# Patient Record
Sex: FEMALE
[De-identification: names, ages, dates, MRNs, and addresses within clinical notes are randomized per-mention and may not be internally consistent; named-entity substitution may affect disease eponyms.]

---

## 2024-07-16 PROBLEM — Z00.00 ENCOUNTER FOR PREVENTIVE HEALTH EXAMINATION: Status: ACTIVE | Noted: 2024-07-16

## 2024-08-01 ENCOUNTER — NON-APPOINTMENT (OUTPATIENT)
Age: 39
End: 2024-08-01

## 2024-08-01 ENCOUNTER — APPOINTMENT (OUTPATIENT)
Dept: NEUROLOGY | Facility: CLINIC | Age: 39
End: 2024-08-01

## 2024-08-01 VITALS
WEIGHT: 155 LBS | SYSTOLIC BLOOD PRESSURE: 126 MMHG | HEART RATE: 66 BPM | DIASTOLIC BLOOD PRESSURE: 90 MMHG | OXYGEN SATURATION: 94 % | TEMPERATURE: 95.6 F | HEIGHT: 62 IN | BODY MASS INDEX: 28.52 KG/M2

## 2024-08-01 DIAGNOSIS — G91.9 HYDROCEPHALUS, UNSPECIFIED: ICD-10-CM

## 2024-08-01 DIAGNOSIS — Z78.9 OTHER SPECIFIED HEALTH STATUS: ICD-10-CM

## 2024-08-01 PROCEDURE — 99205 OFFICE O/P NEW HI 60 MIN: CPT

## 2024-08-01 RX ORDER — OMEPRAZOLE 20 MG/1
20 CAPSULE, DELAYED RELEASE ORAL
Refills: 0 | Status: ACTIVE | COMMUNITY

## 2024-08-01 RX ORDER — CLONIDINE HYDROCHLORIDE 0.1 MG/1
0.1 TABLET ORAL
Refills: 0 | Status: ACTIVE | COMMUNITY

## 2024-08-01 RX ORDER — POLYVINYL ALCOHOL 1.4 %
1.4 DROPS OPHTHALMIC (EYE)
Refills: 0 | Status: ACTIVE | COMMUNITY

## 2024-08-01 RX ORDER — NYSTATIN 100000 [USP'U]/ML
100000 SUSPENSION ORAL
Refills: 0 | Status: ACTIVE | COMMUNITY

## 2024-08-01 RX ORDER — MAGNESIUM HYDROXIDE 100 %
POWDER (GRAM) MISCELLANEOUS
Refills: 0 | Status: ACTIVE | COMMUNITY

## 2024-08-01 RX ORDER — LAMOTRIGINE 100 MG/1
100 TABLET ORAL
Refills: 0 | Status: ACTIVE | COMMUNITY

## 2024-08-01 RX ORDER — ACETAMINOPHEN 500 MG/1
TABLET ORAL
Refills: 0 | Status: ACTIVE | COMMUNITY

## 2024-08-01 RX ORDER — DIPHENHYDRAMINE HCL 25 MG/1
25 CAPSULE ORAL
Refills: 0 | Status: ACTIVE | COMMUNITY

## 2024-08-01 RX ORDER — MIRTAZAPINE 15 MG/1
15 TABLET, FILM COATED ORAL
Refills: 0 | Status: ACTIVE | COMMUNITY

## 2024-08-01 RX ORDER — ALPRAZOLAM 2 MG/1
TABLET ORAL
Refills: 0 | Status: ACTIVE | COMMUNITY

## 2024-08-01 RX ORDER — TRIAMCINOLONE ACETONIDE 55 MCG
55 AEROSOL WITH ADAPTER (GRAM) NASAL
Refills: 0 | Status: ACTIVE | COMMUNITY

## 2024-08-01 RX ORDER — NORGESTIMATE AND ETHINYL ESTRADIOL 0.25-0.035
0.25-35 KIT ORAL
Refills: 0 | Status: ACTIVE | COMMUNITY

## 2024-08-01 RX ORDER — BENZOYL PEROXIDE 10 G/100G
SUSPENSION TOPICAL
Refills: 0 | Status: ACTIVE | COMMUNITY

## 2024-08-01 RX ORDER — METFORMIN HYDROCHLORIDE 500 MG/1
500 TABLET, COATED ORAL
Refills: 0 | Status: ACTIVE | COMMUNITY

## 2024-08-01 RX ORDER — LORATADINE 10 MG
17 TABLET,DISINTEGRATING ORAL
Refills: 0 | Status: ACTIVE | COMMUNITY

## 2024-08-01 RX ORDER — BACITRACIN 500 [USP'U]/G
OINTMENT OPHTHALMIC
Refills: 0 | Status: ACTIVE | COMMUNITY

## 2024-08-01 RX ORDER — GENTIAN VIOLET 2% 2 G/100ML
2 SOLUTION TOPICAL
Refills: 0 | Status: ACTIVE | COMMUNITY

## 2024-08-01 RX ORDER — UBIDECARENONE/VIT E ACET 100MG-5
CAPSULE ORAL
Refills: 0 | Status: ACTIVE | COMMUNITY

## 2024-08-01 RX ORDER — LISINOPRIL 5 MG/1
5 TABLET ORAL
Refills: 0 | Status: ACTIVE | COMMUNITY

## 2024-08-01 RX ORDER — DEXTROMETHORPHAN HYDROBROMIDE AND GUAIFENESIN 10; 100 MG/5ML; MG/5ML
10-100 SOLUTION ORAL
Refills: 0 | Status: ACTIVE | COMMUNITY

## 2024-08-01 RX ORDER — LEVOTHYROXINE SODIUM 100 UG/1
100 TABLET ORAL
Refills: 0 | Status: ACTIVE | COMMUNITY

## 2024-08-01 RX ORDER — ADHESIVE TAPE 1.5"X360"
TAPE, NON-MEDICATED TOPICAL
Refills: 0 | Status: ACTIVE | COMMUNITY

## 2024-08-14 ENCOUNTER — APPOINTMENT (OUTPATIENT)
Dept: MRI IMAGING | Facility: HOSPITAL | Age: 39
End: 2024-08-14

## 2024-08-22 ENCOUNTER — APPOINTMENT (OUTPATIENT)
Dept: NEUROSURGERY | Facility: CLINIC | Age: 39
End: 2024-08-22
Payer: MEDICAID

## 2024-08-22 ENCOUNTER — APPOINTMENT (OUTPATIENT)
Dept: INTERNAL MEDICINE | Facility: CLINIC | Age: 39
End: 2024-08-22

## 2024-08-22 VITALS
TEMPERATURE: 98.6 F | OXYGEN SATURATION: 97 % | BODY MASS INDEX: 29.08 KG/M2 | WEIGHT: 158 LBS | DIASTOLIC BLOOD PRESSURE: 78 MMHG | SYSTOLIC BLOOD PRESSURE: 121 MMHG | HEIGHT: 62 IN | HEART RATE: 55 BPM

## 2024-08-22 DIAGNOSIS — Z78.9 OTHER SPECIFIED HEALTH STATUS: ICD-10-CM

## 2024-08-22 DIAGNOSIS — R32 UNSPECIFIED URINARY INCONTINENCE: ICD-10-CM

## 2024-08-22 DIAGNOSIS — F84.0 AUTISTIC DISORDER: ICD-10-CM

## 2024-08-22 DIAGNOSIS — R26.9 UNSPECIFIED ABNORMALITIES OF GAIT AND MOBILITY: ICD-10-CM

## 2024-08-22 PROCEDURE — 99204 OFFICE O/P NEW MOD 45 MIN: CPT

## 2024-08-22 PROCEDURE — 99215 OFFICE O/P EST HI 40 MIN: CPT | Mod: NC

## 2024-08-22 NOTE — DATA REVIEWED
[de-identified] : head wo cont, w/ CSF flow on 8/14/24 in PACS ACC: [id number]     EXAM:  MR BRAIN W CSF FLOW   ORDERED BY: EVANGELINA LANE  PROCEDURE DATE:  08/14/2024    INTERPRETATION:  PROCEDURE: MRI Brain without contrast  INDICATION: Hydrocephalus  TECHNIQUE: Multiplanar multi sequential MR images the brain were obtained without IV contrast. CSF flow analysis was performed using velocity encoded sequences..  COMPARISON: MRI brain dated 6/25/2024, 11/23/2022, and 4/5/2011  FINDINGS: Examination is degraded by motion artifact.  Again demonstrated is marked enlargement of the lateral and third ventricles particularly involving the bilateral occipital horns with relative normal size of the fourth ventricle. The ventricular caliber is stable dating back to 11/23/2022. However, there is increase caliber of the lateral and third ventricles compared to 4/5/2011.  CSF flow analysis is limited due to extensive motion artifact particularly on the sagittal images. There is some flow-related noted within the cerebral aqueduct on the oblique images.  There is tapered appearance of the distal portion of the cerebral aqueduct (series 5 image 14).  There is no acute hemorrhage or mass effect. There is no evidence of recent infarction diffusion-weighted imaging. There is diffuse periventricular FLAIR hyperintense signal surrounding the occipital horns which is stable from prior exam.  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 The vascular flow voids are otherwise maintained. There are no air-fluid levels within the paranasal sinuses. The mastoid air cells are well-aerated.  IMPRESSION:  Again demonstrated is marked enlargement of the lateral and third ventricles with relative normal size of the fourth ventricle. The ventricular caliber is stable dating back to 11/23/2022 but has increased compared to 4/5/2011. There is a tapered appearance the distal portion the cerebral aqueduct. These findings are suspicious for underlying aqueductal stenosis and/or web although evaluation is limited due to motion artifact. CSF flow analysis is limited due to extensive motion artifact. There is some flow noted within the cerebral aqueduct on the oblique images.  --- End of Report ---      VIOLETA BUSTAMANTE MD; Attending Radiologist This document has been electronically signed. Aug 16 2024  2:02PM

## 2024-08-22 NOTE — HISTORY OF PRESENT ILLNESS
[FreeTextEntry1] : obstructive hydrocephalus [de-identified] : 39-year-old woman with h/o autism spectrum disorder and developmental delay, epilepsy who was being worked up by her previous neurologist for [facility]. Patient over the last few years has become less communicative and over the last year started having falls. Janeth has lived with full dependence on her caregivers her entire life but now needs assistance to ambulate and cannot ambulate independently. A year ago, she was able to walk without assistance. She was able to use a toilet a year ago and had better bladder control. Now she is fully incontinent and has to use diapers. 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 Patient's mother brought her MRIs. She had an MRI in 2011 and again in 2022 and there is a significant increase in size of occipital horns. Repeat MRI done earlier this year after her visit to see dr. Lynch shows persistent hydrocephalus. 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  Referral from Mr. Curry and Dr. Troy in [facility]  TODAY: 8/22/24: Presents with her mother and caregiver Noa. She is non verbal, walks with a walking belt and assistance. She is incontinent of urine worsening in the last 4 years and worsening gait with falls over the past 4 years. She had peritenal tumors s/p surgery and hot chemotherpay and was having body scans when MRI demonstrated hydrocephalus.

## 2024-08-22 NOTE — ADDENDUM
[FreeTextEntry1] :   Patient Name: YUN BARAJAS   Chief Complaint (CC):   - The patient's deteriorating mobility and increasing instances of incontinence.   History of Present Illness:   - The 39-year-old female patient has begun presenting with issues relating to mobility and continence over the past several months. Previously, in 2018, the patient was diagnosed with tumors in her abdomen which were discovered during a sonogram for missed periods. 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 However, after further biopsies came back negative, the diagnosis was reversed. The tumors were localized to the abdomen, surgically removed, and accompanied by a four-hour hot chemotherapy session. Since then, the patient's body continues to produce non-malignant tumors. However, their growth has currently remained manageable.   Past Medical History (PMH):   - The patient has a history of abdominal tumors that were surgically removed and treated with a hot chemotherapy session in 2018. There is a documented history of an enlarged ventricle from a previous brain MRI in 2011.   Family History (FH):   - No pertinent information provided in conversation.   Social History (SH):   - The patient is currently living in the [facility], a care facility due to declining health and mobility.   Medications:   - The patient is on a number of medications including Lisinopril, Mystatin, Clonidine, Lamictal, Sprintec, Hydrochlorothiazide for high blood pressure, Debrox, Econazole cream for her skin, and Methrazole. She also takes Lorazepam, Vitamin D, Artificial tears, Multivitamin, Metformin for weight control, and Levothyroxine for hypothyroidism. Other medications include Mirtazapine for anxiety, Prilosec for the stomach, Lexapro, Nasacort, Nystatin, and Benzoyl Peroxide .   Allergies:   - No known allergies disclosed during the conversation.   Review of Systems (ROS):   - No further system reviews provided in conversation.   Physical Examination (PE):   - A physical examination was not conducted during the conversation but a previous brain MRI from 2011 indicated the patient has enlarged ventricles.   Laboratory/Data Review:   - No specific lab results or diagnostic images were reviewed during the conversation. However, the conversation revealed that a brain MRI was performed and significant enlargement of the ventricles was detected.   Assessment:   - The patient appears to have developed worsening mobility and frequent incontinence, possibly as a result of what is suspected to be aqueductal stenosis or narrowing of the aqueducts. This condition likely resulted in true hydrocephalus, rather than the typical normal pressure hydrocephalus experienced by the elderly. This conclusion is supported by observations of her enlarged ventricles and apparently normal head size.   Plan:   - A third ventriculosity is being considered as a potential alternative to a shunt for the patient. The aim of the procedure would be to bypass the blockage by creating a hole in the membrane between the two affected areas, thereby releasing the trapped fluid and relieving the hydrocephalus without needing to install a permanent shunt. A catheter will be temporarily installed post-procedure for monitoring purposes. The considerations of transferring to a permanent shunt following the procedure were also discussed just in case the initial procedure proves insufficient.   Preventive Health:   - Not discussed in the conversation.   Patient Name: YUN BARAJAS   Chief Complaint (CC):   - Excess cerebral spinal fluid in the brain ventricles causing blockage.   History of Present Illness:   - Present during the encounter is an individual, likely in the senior age group, whose brain scan reveals an excess amount of cerebral spinal fluid in the brain ventricles. A blockage is preventing the fluid from properly draining, and this may be contributing to her balance issues.   Past Medical History (PMH):   - The patient has had abdominal tumors in the past. No further past medical history is provided.   Family History (FH):   - Not mentioned in the conversation.   Social History (SH):   - Not mentioned in the conversation.   Medications:   - Not mentioned in the conversation.   Allergies:   - Not specified in the conversation.   Review of Systems (ROS):   - Not mentioned in the conversation.   Physical Examination (PE):   - Based on a review of her brain scans, the patient's ventricles are enlarged due to excess cerebral spinal fluid. There is a blockage found above the fourth ventricle preventing proper fluid flow.   Laboratory/Data Review:   - A review of a CSF flow study confirmed the blockage as it revealed no flow through the affected area.   Assessment:   - The patient's symptoms of balance issues may be linked to the excess fluid in her brain. This condition could be disrupting normal brain function, affecting her balance.   Plan:   - I have planned a procedure where I will create an alternative passageway by puncturing a thin membrane in the brain, allowing the fluid to be drained. If the blockage recloses, the operation can be reattempted. If reclosure occurs a third time, the patient will then receive a shunt. Post-operation, we will monitor the patient's pressure overnight to ensure it's low enough. The patient will be under general anesthesia for the procedure and should be discharged 24 to 48 hours after the procedure.   Preventive Health:   - The patient's condition is not hereditary and there is no immediate preventive health advice to offer at this stage.

## 2024-08-22 NOTE — PHYSICAL EXAM
[General Appearance - Alert] : alert [General Appearance - In No Acute Distress] : in no acute distress [Cranial Nerves Oculomotor (III)] : extraocular motion intact [Cranial Nerves Facial (VII)] : face symmetrical [Involuntary Movements] : no involuntary movements were seen [No Muscle Atrophy] : normal bulk in all four extremities [Limited Balance] : the patient's balance was impaired [FreeTextEntry1] : non verbal  [Person] : disoriented to person [Place] : disoriented to place [Time] : disoriented to time [Short Term Intact] : short term memory impaired [Span Intact] : the attention span was decreased [Fluency] : fluency not intact [Current Events] : inadequate knowledge of current events [Past History] : inadequate knowledge of personal past history [Tremor] : no tremor present [FreeTextEntry5] : pt is autistic

## 2024-08-22 NOTE — HISTORY OF PRESENT ILLNESS
[de-identified] : 39-year-old woman with h/o autism spectrum disorder and developmental delay, epilepsy who was being worked up by her previous neurologist for [facility]. Patient over the last few years has become less communicative and over the last year started having falls. Janeth has lived with full dependence on her caregivers her entire life but now needs assistance to ambulate and cannot ambulate independently. A year ago, she was able to walk without assistance. She was able to use a toilet a year ago and had better bladder control. Now she is fully incontinent and has to use diapers. 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 Patient's mother brought her MRIs. She had an MRI in 2011 and again in 2022 and there is a significant increase in size of occipital horns. Repeat MRI done earlier this year after her visit to see dr. Lynch shows persistent hydrocephalus. 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  Referral from Mr. Curry and Dr. Troy in [facility]  TODAY: 8/22/24: Presents with her mother and caregiver Noa. She is non verbal, walks with a walking belt and assistance. She is incontinent of urine worsening in the last 4 years and worsening gait with falls over the past 4 years. She had peritenal tumors s/p surgery and hot chemotherpay and was having body scans when MRI demonstrated hydrocephalus.

## 2024-08-22 NOTE — HISTORY OF PRESENT ILLNESS
[de-identified] : 39-year-old woman with h/o autism spectrum disorder and developmental delay, epilepsy who was being worked up by her previous neurologist for [facility]. Patient over the last few years has become less communicative and over the last year started having falls. Janeth has lived with full dependence on her caregivers her entire life but now needs assistance to ambulate and cannot ambulate independently. A year ago, she was able to walk without assistance. She was able to use a toilet a year ago and had better bladder control. Now she is fully incontinent and has to use diapers. 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 Patient's mother brought her MRIs. She had an MRI in 2011 and again in 2022 and there is a significant increase in size of occipital horns. Repeat MRI done earlier this year after her visit to see dr. Lynch shows persistent hydrocephalus. 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  Referral from Mr. Curry and Dr. Troy in [facility]  TODAY: 8/22/24: Presents with her mother and caregiver Noa. She is non verbal, walks with a walking belt and assistance. She is incontinent of urine worsening in the last 4 years and worsening gait with falls over the past 4 years. She had peritenal tumors s/p surgery and hot chemotherpay and was having body scans when MRI demonstrated hydrocephalus.

## 2024-08-22 NOTE — ADDENDUM
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 Limited flow seen through aqueduct on MRI and there appears to be web-like obstruction. Patient is symptomatic with non-ambulation, incontinence, depressed mental status. At baseline she is cognitively impaired and lives in an assisted living facility. Patient also evaluated by neurology team who believe the hydrocephalus is symptomatic. We discussed shunt vs ETV vs no surgery. Patient is not a good candidate for shunt given history of multiple recurrent abdominal tumors. Risk of endoscopic third ventriculostomy including infection, memory difficulty, poor or worsened neurological outcome, intracranial bleeding, need for re-operation, closure of third ventriculostomy, need for shunt in future discussed. Mother understands and wishes to proceed with endoscopic third ventriculostomy.

## 2024-08-22 NOTE — ASSESSMENT
[FreeTextEntry1] : I, Dr. Sears, personally performed the evaluation and management (E/M) services for this new patient who presents today with hydrocephalus. That E/M includes conducting the examination, assessing all new/exacerbated conditions, and establishing a new plan of care. Today, my ACP, Fannie Swan, was here to observe my evaluation and management services for this new problem/exacerbated condition to be followed going forward.  PLAN: - Dr. Fan Marie and Dr. Sears reviewed imaging and recommended a third ventriculostomy surgery.  - PST paperwork provided and reviewed - CHG wipes provided and reviewed.

## 2024-08-22 NOTE — ADDENDUM
[FreeTextEntry1] :   Patient Name: YUN BARAJAS   Chief Complaint (CC):   - The patient's deteriorating mobility and increasing instances of incontinence.   History of Present Illness:   - The 39-year-old female patient has begun presenting with issues relating to mobility and continence over the past several months. Previously, in 2018, the patient was diagnosed with tumors in her abdomen which were discovered during a sonogram for missed periods. 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 However, after further biopsies came back negative, the diagnosis was reversed. The tumors were localized to the abdomen, surgically removed, and accompanied by a four-hour hot chemotherapy session. Since then, the patient's body continues to produce non-malignant tumors. However, their growth has currently remained manageable.   Past Medical History (PMH):   - The patient has a history of abdominal tumors that were surgically removed and treated with a hot chemotherapy session in 2018. There is a documented history of an enlarged ventricle from a previous brain MRI in 2011.   Family History (FH):   - No pertinent information provided in conversation.   Social History (SH):   - The patient is currently living in the [facility], a care facility due to declining health and mobility.   Medications:   - The patient is on a number of medications including Lisinopril, Mystatin, Clonidine, Lamictal, Sprintec, Hydrochlorothiazide for high blood pressure, Debrox, Econazole cream for her skin, and Methrazole. She also takes Lorazepam, Vitamin D, Artificial tears, Multivitamin, Metformin for weight control, and Levothyroxine for hypothyroidism. Other medications include Mirtazapine for anxiety, Prilosec for the stomach, Lexapro, Nasacort, Nystatin, and Benzoyl Peroxide .   Allergies:   - No known allergies disclosed during the conversation.   Review of Systems (ROS):   - No further system reviews provided in conversation.   Physical Examination (PE):   - A physical examination was not conducted during the conversation but a previous brain MRI from 2011 indicated the patient has enlarged ventricles.   Laboratory/Data Review:   - No specific lab results or diagnostic images were reviewed during the conversation. However, the conversation revealed that a brain MRI was performed and significant enlargement of the ventricles was detected.   Assessment:   - The patient appears to have developed worsening mobility and frequent incontinence, possibly as a result of what is suspected to be aqueductal stenosis or narrowing of the aqueducts. This condition likely resulted in true hydrocephalus, rather than the typical normal pressure hydrocephalus experienced by the elderly. This conclusion is supported by observations of her enlarged ventricles and apparently normal head size.   Plan:   - A third ventriculosity is being considered as a potential alternative to a shunt for the patient. The aim of the procedure would be to bypass the blockage by creating a hole in the membrane between the two affected areas, thereby releasing the trapped fluid and relieving the hydrocephalus without needing to install a permanent shunt. A catheter will be temporarily installed post-procedure for monitoring purposes. The considerations of transferring to a permanent shunt following the procedure were also discussed just in case the initial procedure proves insufficient.   Preventive Health:   - Not discussed in the conversation.   Patient Name: YUN BARAJAS   Chief Complaint (CC):   - Excess cerebral spinal fluid in the brain ventricles causing blockage.   History of Present Illness:   - Present during the encounter is an individual, likely in the senior age group, whose brain scan reveals an excess amount of cerebral spinal fluid in the brain ventricles. A blockage is preventing the fluid from properly draining, and this may be contributing to her balance issues.   Past Medical History (PMH):   - The patient has had abdominal tumors in the past. No further past medical history is provided.   Family History (FH):   - Not mentioned in the conversation.   Social History (SH):   - Not mentioned in the conversation.   Medications:   - Not mentioned in the conversation.   Allergies:   - Not specified in the conversation.   Review of Systems (ROS):   - Not mentioned in the conversation.   Physical Examination (PE):   - Based on a review of her brain scans, the patient's ventricles are enlarged due to excess cerebral spinal fluid. There is a blockage found above the fourth ventricle preventing proper fluid flow.   Laboratory/Data Review:   - A review of a CSF flow study confirmed the blockage as it revealed no flow through the affected area.   Assessment:   - The patient's symptoms of balance issues may be linked to the excess fluid in her brain. This condition could be disrupting normal brain function, affecting her balance.   Plan:   - I have planned a procedure where I will create an alternative passageway by puncturing a thin membrane in the brain, allowing the fluid to be drained. If the blockage recloses, the operation can be reattempted. If reclosure occurs a third time, the patient will then receive a shunt. Post-operation, we will monitor the patient's pressure overnight to ensure it's low enough. The patient will be under general anesthesia for the procedure and should be discharged 24 to 48 hours after the procedure.   Preventive Health:   - The patient's condition is not hereditary and there is no immediate preventive health advice to offer at this stage.

## 2024-08-26 ENCOUNTER — APPOINTMENT (OUTPATIENT)
Dept: NEUROSURGERY | Facility: CLINIC | Age: 39
End: 2024-08-26

## 2024-08-26 ENCOUNTER — APPOINTMENT (OUTPATIENT)
Dept: INTERNAL MEDICINE | Facility: CLINIC | Age: 39
End: 2024-08-26

## 2024-08-26 ENCOUNTER — NON-APPOINTMENT (OUTPATIENT)
Age: 39
End: 2024-08-26

## 2024-08-26 ENCOUNTER — APPOINTMENT (OUTPATIENT)
Dept: INTERNAL MEDICINE | Facility: CLINIC | Age: 39
End: 2024-08-26
Payer: MEDICAID

## 2024-08-26 VITALS
OXYGEN SATURATION: 99 % | HEIGHT: 62 IN | WEIGHT: 158 LBS | SYSTOLIC BLOOD PRESSURE: 130 MMHG | DIASTOLIC BLOOD PRESSURE: 85 MMHG | TEMPERATURE: 97.6 F | HEART RATE: 78 BPM | BODY MASS INDEX: 29.08 KG/M2

## 2024-08-26 DIAGNOSIS — G91.9 HYDROCEPHALUS, UNSPECIFIED: ICD-10-CM

## 2024-08-26 DIAGNOSIS — Z01.818 ENCOUNTER FOR OTHER PREPROCEDURAL EXAMINATION: ICD-10-CM

## 2024-08-26 PROCEDURE — 99203 OFFICE O/P NEW LOW 30 MIN: CPT

## 2024-08-26 PROCEDURE — 93000 ELECTROCARDIOGRAM COMPLETE: CPT

## 2024-08-26 PROCEDURE — G2211 COMPLEX E/M VISIT ADD ON: CPT | Mod: NC

## 2024-08-26 NOTE — ASSESSMENT
[Patient Optimized for Surgery] : Patient optimized for surgery [No Further Testing Recommended] : no further testing recommended [Modify medications prior to procedure] : Modify medications prior to procedure [As per surgery] : as per surgery [FreeTextEntry4] : Intermediate risk procedure  pt with good functional status (METS>4) [FreeTextEntry7] : Stop HCTZ and metformin 1 day prior to the Sx, avoid sedative medications

## 2024-08-26 NOTE — HISTORY OF PRESENT ILLNESS
[No Pertinent Cardiac History] : no history of aortic stenosis, atrial fibrillation, coronary artery disease, recent myocardial infarction, or implantable device/pacemaker [No Pertinent Pulmonary History] : no history of asthma, COPD, sleep apnea, or smoking [No Adverse Anesthesia Reaction] : no adverse anesthesia reaction in self or family member [(Patient denies any chest pain, claudication, dyspnea on exertion, orthopnea, palpitations or syncope)] : Patient denies any chest pain, claudication, dyspnea on exertion, orthopnea, palpitations or syncope [Moderate (4-6 METs)] : Moderate (4-6 METs) [Chronic Anticoagulation] : no chronic anticoagulation [Chronic Kidney Disease] : no chronic kidney disease [Diabetes] : no diabetes [FreeTextEntry1] : third ventriculostomy  [FreeTextEntry2] : 9/3/24 [FreeTextEntry3] : Dr Sears [FreeTextEntry4] : Ms. BARAJAS is a39 year F with PMH of Autism, hydrocephalus, HTN, who comes for pre-op examination with her caregivers. No complaints today. Walk almost everyday on the treadmill for 20-30 minutes, does not complaint of SOB/CP

## 2024-08-27 LAB
ALBUMIN SERPL ELPH-MCNC: 3.9 G/DL
ALP BLD-CCNC: 70 U/L
ALT SERPL-CCNC: 34 U/L
ANION GAP SERPL CALC-SCNC: 13 MMOL/L
APTT BLD: 31.7 SEC
AST SERPL-CCNC: 18 U/L
BASOPHILS # BLD AUTO: 0.03 K/UL
BASOPHILS NFR BLD AUTO: 0.5 %
BILIRUB SERPL-MCNC: 0.3 MG/DL
BUN SERPL-MCNC: 18 MG/DL
CALCIUM SERPL-MCNC: 10.2 MG/DL
CHLORIDE SERPL-SCNC: 99 MMOL/L
CO2 SERPL-SCNC: 30 MMOL/L
CREAT SERPL-MCNC: 0.68 MG/DL
EGFR: 114 ML/MIN/1.73M2
EOSINOPHIL # BLD AUTO: 0.01 K/UL
EOSINOPHIL NFR BLD AUTO: 0.2 %
ESTIMATED AVERAGE GLUCOSE: 91 MG/DL
GLUCOSE SERPL-MCNC: 85 MG/DL
HBA1C MFR BLD HPLC: 4.8 %
HCG SERPL-MCNC: <1 MIU/ML
HCT VFR BLD CALC: 40.5 %
HGB BLD-MCNC: 13.2 G/DL
IMM GRANULOCYTES NFR BLD AUTO: 0.2 %
INR PPP: 0.92 RATIO
LYMPHOCYTES # BLD AUTO: 1.92 K/UL
LYMPHOCYTES NFR BLD AUTO: 30.1 %
MAN DIFF?: NORMAL
MCHC RBC-ENTMCNC: 29.5 PG
MCHC RBC-ENTMCNC: 32.6 GM/DL
MCV RBC AUTO: 90.4 FL
MONOCYTES # BLD AUTO: 0.49 K/UL
MONOCYTES NFR BLD AUTO: 7.7 %
NEUTROPHILS # BLD AUTO: 3.91 K/UL
NEUTROPHILS NFR BLD AUTO: 61.3 %
PLATELET # BLD AUTO: 314 K/UL
POTASSIUM SERPL-SCNC: 3.2 MMOL/L
PROT SERPL-MCNC: 7.1 G/DL
PT BLD: 10.4 SEC
RBC # BLD: 4.48 M/UL
RBC # FLD: 14.5 %
SODIUM SERPL-SCNC: 142 MMOL/L
WBC # FLD AUTO: 6.37 K/UL

## 2024-09-03 ENCOUNTER — TRANSCRIPTION ENCOUNTER (OUTPATIENT)
Age: 39
End: 2024-09-03

## 2024-09-04 ENCOUNTER — APPOINTMENT (OUTPATIENT)
Dept: NEUROSURGERY | Facility: HOSPITAL | Age: 39
End: 2024-09-04

## 2024-09-06 ENCOUNTER — TRANSCRIPTION ENCOUNTER (OUTPATIENT)
Age: 39
End: 2024-09-06

## 2024-09-19 ENCOUNTER — APPOINTMENT (OUTPATIENT)
Dept: NEUROSURGERY | Facility: CLINIC | Age: 39
End: 2024-09-19
Payer: MEDICAID

## 2024-09-19 VITALS
BODY MASS INDEX: 29.08 KG/M2 | TEMPERATURE: 98 F | DIASTOLIC BLOOD PRESSURE: 82 MMHG | WEIGHT: 158 LBS | HEART RATE: 66 BPM | OXYGEN SATURATION: 95 % | SYSTOLIC BLOOD PRESSURE: 132 MMHG | HEIGHT: 62 IN | RESPIRATION RATE: 16 BRPM

## 2024-09-19 DIAGNOSIS — F84.0 AUTISTIC DISORDER: ICD-10-CM

## 2024-09-19 DIAGNOSIS — G91.1 OBSTRUCTIVE HYDROCEPHALUS: ICD-10-CM

## 2024-09-19 DIAGNOSIS — Z87.898 PERSONAL HISTORY OF OTHER SPECIFIED CONDITIONS: ICD-10-CM

## 2024-09-19 DIAGNOSIS — Z78.9 OTHER SPECIFIED HEALTH STATUS: ICD-10-CM

## 2024-09-19 PROCEDURE — 99024 POSTOP FOLLOW-UP VISIT: CPT

## 2024-09-19 NOTE — REASON FOR VISIT
[Family Member] : family member [de-identified] : Ventriculocisternostomy of third ventricle with use of endoscope [de-identified] : 9/4/2024

## 2024-09-19 NOTE — REASON FOR VISIT
[Family Member] : family member [de-identified] : Ventriculocisternostomy of third ventricle with use of endoscope [de-identified] : 9/4/2024

## 2024-09-19 NOTE — HISTORY OF PRESENT ILLNESS
Carleen Come from Dr. Adela Sneed office is requesting to speak with a nurse asap. She has not received a response for the lab req that was sent over and it is time sensitive. Thanks. [FreeTextEntry1] : obstructive hydrocephalus [de-identified] : 39-year-old woman with h/o autism spectrum disorder and developmental delay, epilepsy who was being worked up by her previous neurologist for [facility]. Patient over the last few years has become less communicative and over the last year started having falls. Janeth has lived with full dependence on her caregivers her entire life but now needs assistance to ambulate and cannot ambulate independently. A year ago, she was able to walk without assistance. She was able to use a toilet a year ago and had better bladder control. Now she is fully incontinent and has to use diapers. 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 Patient's mother brought her MRIs. She had an MRI in 2011 and again in 2022 and there is a significant increase in size of occipital horns. Repeat MRI done earlier this year after her visit to see dr. Lynch shows persistent hydrocephalus. 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  Referral from Mr. Curry and Dr. Troy in [facility] [place] [facility]  Initial visit 8/22/24  Presents with her mother and caregiver Noa. She is non verbal, walks with a walking belt and assistance. She is incontinent of urine worsening in the last 4 years and worsening gait with falls over the past 4 years. She had peritenal tumors s/p surgery and hot chemotherpay and was having body scans when MRI demonstrated hydrocephalus.

## 2024-09-19 NOTE — HISTORY OF PRESENT ILLNESS
[FreeTextEntry1] : obstructive hydrocephalus [de-identified] : 39-year-old woman with h/o autism spectrum disorder and developmental delay, epilepsy who was being worked up by her previous neurologist for [facility]. Patient over the last few years has become less communicative and over the last year started having falls. Janeth has lived with full dependence on her caregivers her entire life but now needs assistance to ambulate and cannot ambulate independently. A year ago, she was able to walk without assistance. She was able to use a toilet a year ago and had better bladder control. Now she is fully incontinent and has to use diapers. 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 Patient's mother brought her MRIs. She had an MRI in 2011 and again in 2022 and there is a significant increase in size of occipital horns. Repeat MRI done earlier this year after her visit to see dr. Lynch shows persistent hydrocephalus. 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  Referral from Mr. Curry and Dr. Troy in [facility] [place] [facility]  Initial visit 8/22/24  Presents with her mother and caregiver Noa. She is non verbal, walks with a walking belt and assistance. She is incontinent of urine worsening in the last 4 years and worsening gait with falls over the past 4 years. She had peritenal tumors s/p surgery and hot chemotherpay and was having body scans when MRI demonstrated hydrocephalus.

## 2024-09-19 NOTE — ASSESSMENT
[FreeTextEntry1] : stable post op recovery. staples removed today without complication  PLAN - daily PT for balance/gait training - daily shower with soap - repeat MRI brain wo CSF flow in 12/2024 - f/u after image  I, Dr. Fan Marie, personally performed the evaluation and management (E/M) services for this established patient who presents today with (a) new problem(s)/exacerbation of (an) existing condition(s). That E/M includes conducting the clinically appropriate interval history &/or exam, assessing all new/exacerbated conditions, and establishing a new plan of care. Today, my JARED, Hyunchu Rebecca-Gold, was here to observe my evaluation and management service for this new problem/exacerbated condition and follow the plan of care established by me going forward.